# Patient Record
(demographics unavailable — no encounter records)

---

## 2025-01-09 NOTE — REVIEW OF SYSTEMS
[Ear Noises] : ear noises [Patient Intake Form Reviewed] : Patient intake form was reviewed [Negative] : Ear [de-identified] : left ear noise sounds like static, patient denies pain, denies hearing loss

## 2025-01-09 NOTE — DATA REVIEWED
[de-identified] :  Type A tymps, AU. Testing via inserts: AD- WNL up to 1.5kHz, sloping to mild, sloping to moderately-severe SNHL. AS- WNL up to 1.5kHz, sloping to mild, sloping to moderate / moderately-severe SNHL. Recs: 1) F/u w/ MD 2) Further tests as per MD 3) Hearing aids pending med clearance 4) Annual

## 2025-01-09 NOTE — ASSESSMENT
[FreeTextEntry1] : cuauhtemoc cleared au audio au loss [phone] as tinnitus Noise or tinnitus can be masked with external sounds such as white noise.  Fans or ac at night can help or the use of a sound generating humphrey or device. Hearing aids if indicated can have a masking function programed in. rec fu audio 1 y

## 2025-01-09 NOTE — PHYSICAL EXAM
[Midline] : trachea located in midline position [Normal] : no rashes [de-identified] : cuauhtemoc

## 2025-03-04 NOTE — HEALTH RISK ASSESSMENT
[0] : 2) Feeling down, depressed, or hopeless: Not at all (0) [PHQ-2 Negative - No further assessment needed] : PHQ-2 Negative - No further assessment needed [Former] : Former [10-14] : 10-14 [> 15 Years] : > 15 Years [[id number]] : 0

## 2025-03-04 NOTE — ASSESSMENT
[FreeTextEntry1] : htn- refill amlodipine, metoprolol.  hypertriglyceridemia - cont red yeast rice, encouraged continued healthy diet, fiber intake, exercise chronic shoulder pain, hand arthritis - meloxicam prn. Advised pt on possible AE of NSAID use, including possibility of gastritis/ulcer formation with chronic use. Advised pt to take medication with food.

## 2025-03-04 NOTE — HISTORY OF PRESENT ILLNESS
[de-identified] : Pt f/u htn, bradycardia, hypertriglyceridemia Pt has htn on amlodipine and metoprolol. Pt bradycardic on meds, has seen cardio Dr. Roth last seen 2023, holter was wnl, bradycardia likely from metoprolol. Recent nuclear stress test wnl. Pt supplementing red yeast rice.  Has improved healthy diet, fiber intake, exercise Pt on meloxicam for arthritis in hands and shoulder.